# Patient Record
Sex: FEMALE | Race: WHITE | Employment: FULL TIME | URBAN - METROPOLITAN AREA
[De-identification: names, ages, dates, MRNs, and addresses within clinical notes are randomized per-mention and may not be internally consistent; named-entity substitution may affect disease eponyms.]

---

## 2017-07-13 ENCOUNTER — ALLIED HEALTH/NURSE VISIT (OUTPATIENT)
Dept: ONCOLOGY | Facility: CLINIC | Age: 37
End: 2017-07-13
Attending: INTERNAL MEDICINE

## 2017-07-13 ENCOUNTER — RESULTS ONLY (OUTPATIENT)
Dept: LAB | Age: 37
End: 2017-07-13

## 2017-07-13 PROCEDURE — 36415 COLL VENOUS BLD VENIPUNCTURE: CPT

## 2017-07-13 NOTE — PROGRESS NOTES
BMT Research Note    Clinical Data:   IRB # [id number]  PI Name: Clementina Hunter   PI Phone: [phone]    PI Pager: [phone]  : Radha Ojeda   Phone: [phone]  Pager: [phone]  Dates of Participation: 07/13/2017  to Date of Apheresis TBD   Clinical Trial Name: [id number] Open Label Dose Escalation Trial of an Adaptive Natural Killer (NK) Cell Infusion (Postachio-) with Subcutaneous IL-2 in Adults with Refractory or Relapsed Acute Myelogenous Leukemia (AML)    Clinical Trial Sponsor: [email or web address]  Informed Consent:   Visit # Donor Pre Screen  Informed Consent  The patient was provided with a copy of the IRB-approved consent form, and all questions were answered before the patient agreed to participate by signing the informed consent document. A copy of the form was provided to the patient.  Date: 07/13/2017  Consent Version Date: 03/08/2017  Consent obtained by: TEE Esteves  HIPAA: yes  HIPAA Authorization Signed Date: 07/13/2017

## 2017-07-13 NOTE — MR AVS SNAPSHOT
"              After Visit Summary   2017    Sabrina Easton    MRN: [id number]           Patient Information     Date Of Birth          1980        Visit Information        Provider Department      2017 11:30 AM Coordinator,  Oncology Research;  [address] CONSULT [facility]         Follow-ups after your visit        Who to contact     If you have questions or need follow up information about today's clinic visit or your schedule please contact [facility] directly at [phone].  Normal or non-critical lab and imaging results will be communicated to you by MyChart, letter or phone within 4 business days after the clinic has received the results. If you do not hear from us within 7 days, please contact the clinic through [employer] or phone. If you have a critical or abnormal lab result, we will notify you by phone as soon as possible.  Submit refill requests through [employer] or call your pharmacy and they will forward the refill request to us. Please allow 3 business days for your refill to be completed.          Additional Information About Your Visit        MyChart Information     [employer] lets you send messages to your doctor, view your test results, renew your prescriptions, schedule appointments and more. To sign up, go to [email or web address] . Click on \"Log in\" on the left side of the screen, which will take you to the Welcome page. Then click on \"Sign up Now\" on the right side of the page.     You will be asked to enter the access code listed below, as well as some personal information. Please follow the directions to create your username and password.     Your access code is: [email or web address]  Expires: 10/10/2017  6:30 AM     Your access code will  in 90 days. If you need help or a new code, please call your [facility] clinic or [phone].        Care EveryWhere ID     This is your Care EveryWhere ID. This could be used by other organizations to " access your [facility] medical records  [id number]         Blood Pressure from Last 3 Encounters:   No data found for BP    Weight from Last 3 Encounters:   No data found for Wt              Today, you had the following     No orders found for display       Primary Care Provider    None Specified       No primary provider on file.        Equal Access to Services     MANUEL NESBITT : Hadii aad ku hadjorgeosmel Sonnekaali, wamarleenda luqadaha, qaybta kaalmada adeegyada, yarely worrell anaidtaya dawsonabbyjevon mendez . So [facility] [phone].    ATENCIÓN: Si habla español, tiene a marsh disposición servicios gratuitos de asistencia lingüística. Llame al [phone].    We comply with applicable federal civil rights laws and [place] laws. We do not discriminate on the basis of race, color, national origin, age, disability sex, sexual orientation or gender identity.            Thank you!     Thank you for choosing [facility]  for your care. Our goal is always to provide you with excellent care. Hearing back from our patients is one way we can continue to improve our services. Please take a few minutes to complete the written survey that you may receive in the mail after your visit with us. Thank you!             Your Updated Medication List - Protect others around you: Learn how to safely use, store and throw away your medicines at www.disposemymeds.org.      Notice  As of 7/13/2017  1:59 PM    You have not been prescribed any medications.

## 2017-07-14 LAB — CMV IGG SERPL QL IA: 7.2 AI (ref 0–0.8)

## 2018-12-17 ENCOUNTER — ALLIED HEALTH/NURSE VISIT (OUTPATIENT)
Dept: FAMILY MEDICINE | Facility: OTHER | Age: 38
End: 2018-12-17
Attending: FAMILY MEDICINE

## 2018-12-17 DIAGNOSIS — Z11.1 SCREENING EXAMINATION FOR PULMONARY TUBERCULOSIS: Primary | ICD-10-CM

## 2018-12-17 PROCEDURE — 86580 TB INTRADERMAL TEST: CPT

## 2018-12-17 NOTE — PROGRESS NOTES
Mantoux given in right forearm. Pt instructed to return in 48 to 72 hrs to have checked. KOLBY DANG

## 2018-12-19 ENCOUNTER — ALLIED HEALTH/NURSE VISIT (OUTPATIENT)
Dept: FAMILY MEDICINE | Facility: OTHER | Age: 38
End: 2018-12-19
Attending: FAMILY MEDICINE

## 2018-12-19 DIAGNOSIS — Z11.1 VISIT FOR MANTOUX TEST: Primary | ICD-10-CM

## 2018-12-19 NOTE — NURSING NOTE
Chief Complaint   Patient presents with     Mantoux Results Reading       Initial There were no vitals taken for this visit. There is no height or weight on file to calculate BMI.  Medication Reconciliation: complete    Eli Clemens MA     Mantoux results: No induration.  No swelling.  No redness.    Eli Clemens  Medical Assistant

## 2019-02-15 ENCOUNTER — HEALTH MAINTENANCE LETTER (OUTPATIENT)
Age: 39
End: 2019-02-15

## 2020-03-02 ENCOUNTER — HEALTH MAINTENANCE LETTER (OUTPATIENT)
Age: 40
End: 2020-03-02

## 2020-12-20 ENCOUNTER — HEALTH MAINTENANCE LETTER (OUTPATIENT)
Age: 40
End: 2020-12-20

## 2021-04-18 ENCOUNTER — HEALTH MAINTENANCE LETTER (OUTPATIENT)
Age: 41
End: 2021-04-18

## 2021-10-03 ENCOUNTER — HEALTH MAINTENANCE LETTER (OUTPATIENT)
Age: 41
End: 2021-10-03

## 2022-05-14 ENCOUNTER — HEALTH MAINTENANCE LETTER (OUTPATIENT)
Age: 42
End: 2022-05-14

## 2022-09-04 ENCOUNTER — HEALTH MAINTENANCE LETTER (OUTPATIENT)
Age: 42
End: 2022-09-04

## 2023-06-03 ENCOUNTER — HEALTH MAINTENANCE LETTER (OUTPATIENT)
Age: 43
End: 2023-06-03

## 2024-02-18 ENCOUNTER — HEALTH MAINTENANCE LETTER (OUTPATIENT)
Age: 44
End: 2024-02-18